# Patient Record
Sex: FEMALE | RURAL
[De-identification: names, ages, dates, MRNs, and addresses within clinical notes are randomized per-mention and may not be internally consistent; named-entity substitution may affect disease eponyms.]

---

## 2017-05-31 ENCOUNTER — APPOINTMENT (RX ONLY)
Dept: RURAL CLINIC 1 | Facility: CLINIC | Age: 32
Setting detail: DERMATOLOGY
End: 2017-05-31

## 2017-05-31 DIAGNOSIS — L82.0 INFLAMED SEBORRHEIC KERATOSIS: ICD-10-CM

## 2017-05-31 PROBLEM — Z92.3 PERSONAL HISTORY OF IRRADIATION: Status: ACTIVE | Noted: 2017-05-31

## 2017-05-31 PROBLEM — J45.909 UNSPECIFIED ASTHMA, UNCOMPLICATED: Status: ACTIVE | Noted: 2017-05-31

## 2017-05-31 PROBLEM — D48.5 NEOPLASM OF UNCERTAIN BEHAVIOR OF SKIN: Status: ACTIVE | Noted: 2017-05-31

## 2017-05-31 PROCEDURE — 11100: CPT

## 2017-05-31 PROCEDURE — ? COUNSELING

## 2017-05-31 PROCEDURE — ? BIOPSY BY SHAVE METHOD

## 2017-05-31 PROCEDURE — 99202 OFFICE O/P NEW SF 15 MIN: CPT | Mod: 25

## 2017-05-31 ASSESSMENT — LOCATION ZONE DERM: LOCATION ZONE: ARM

## 2017-05-31 ASSESSMENT — LOCATION SIMPLE DESCRIPTION DERM: LOCATION SIMPLE: RIGHT FOREARM

## 2017-05-31 ASSESSMENT — LOCATION DETAILED DESCRIPTION DERM: LOCATION DETAILED: RIGHT DISTAL RADIAL DORSAL FOREARM

## 2017-05-31 NOTE — PROCEDURE: BIOPSY BY SHAVE METHOD
Body Location Override (Optional - Billing Will Still Be Based On Selected Body Map Location If Applicable): Left mid dorsal foot

## 2017-05-31 NOTE — HPI: SKIN LESION
Has Your Skin Lesion Been Treated?: not been treated
Is This A New Presentation, Or A Follow-Up?: Skin Lesion
Is This A New Presentation, Or A Follow-Up?: Growth

## 2018-05-14 ENCOUNTER — APPOINTMENT (RX ONLY)
Dept: RURAL CLINIC 1 | Facility: CLINIC | Age: 33
Setting detail: DERMATOLOGY
End: 2018-05-14

## 2018-05-14 PROBLEM — D23.72 OTHER BENIGN NEOPLASM OF SKIN OF LEFT LOWER LIMB, INCLUDING HIP: Status: ACTIVE | Noted: 2018-05-14

## 2018-05-14 PROCEDURE — ? OTHER

## 2018-05-14 PROCEDURE — ? DEFER

## 2018-05-14 PROCEDURE — ? COUNSELING

## 2018-05-14 PROCEDURE — 99213 OFFICE O/P EST LOW 20 MIN: CPT

## 2018-05-14 NOTE — PROCEDURE: OTHER
Detail Level: Detailed
Other (Free Text): Symptomatic due to regrowth and chronic pain due to location
Note Text (......Xxx Chief Complaint.): This diagnosis correlates with the

## 2018-06-13 ENCOUNTER — APPOINTMENT (RX ONLY)
Dept: RURAL CLINIC 1 | Facility: CLINIC | Age: 33
Setting detail: DERMATOLOGY
End: 2018-06-13

## 2018-06-13 PROBLEM — D48.5 NEOPLASM OF UNCERTAIN BEHAVIOR OF SKIN: Status: ACTIVE | Noted: 2018-06-13

## 2018-06-13 PROCEDURE — 12032 INTMD RPR S/A/T/EXT 2.6-7.5: CPT

## 2018-06-13 PROCEDURE — 11402 EXC TR-EXT B9+MARG 1.1-2 CM: CPT

## 2018-06-13 PROCEDURE — ? EXCISION

## 2018-06-13 PROCEDURE — ? PRESCRIPTION

## 2018-06-13 RX ORDER — CEPHALEXIN 500 MG/1
CAPSULE ORAL
Qty: 10 | Refills: 0 | Status: ERX | COMMUNITY
Start: 2018-06-13

## 2018-06-13 RX ADMIN — CEPHALEXIN: 500 CAPSULE ORAL at 17:21

## 2018-06-13 NOTE — PROCEDURE: EXCISION
steady V-Y Flap Text: The defect edges were debeveled with a #15 scalpel blade.  Given the location of the defect, shape of the defect and the proximity to free margins a V-Y flap was deemed most appropriate.  Using a sterile surgical marker, an appropriate advancement flap was drawn incorporating the defect and placing the expected incisions within the relaxed skin tension lines where possible.    The area thus outlined was incised deep to adipose tissue with a #15 scalpel blade.  The skin margins were undermined to an appropriate distance in all directions utilizing iris scissors.

## 2018-06-27 ENCOUNTER — APPOINTMENT (RX ONLY)
Dept: RURAL CLINIC 1 | Facility: CLINIC | Age: 33
Setting detail: DERMATOLOGY
End: 2018-06-27

## 2018-06-27 DIAGNOSIS — Z71.89 OTHER SPECIFIED COUNSELING: ICD-10-CM

## 2018-06-27 DIAGNOSIS — Z48.817 ENCOUNTER FOR SURGICAL AFTERCARE FOLLOWING SURGERY ON THE SKIN AND SUBCUTANEOUS TISSUE: ICD-10-CM

## 2018-06-27 PROCEDURE — ? POST-OP WOUND CHECK

## 2018-06-27 PROCEDURE — ? COUNSELING

## 2018-06-27 ASSESSMENT — LOCATION ZONE DERM: LOCATION ZONE: FEET

## 2018-06-27 ASSESSMENT — LOCATION SIMPLE DESCRIPTION DERM: LOCATION SIMPLE: LEFT FOOT

## 2018-06-27 ASSESSMENT — LOCATION DETAILED DESCRIPTION DERM: LOCATION DETAILED: LEFT DORSAL FOOT

## 2018-06-27 NOTE — PROCEDURE: POST-OP WOUND CHECK
Detail Level: Detailed
Wound Evaluated By: ROLAND
Add 60920 Cpt? (Important Note: In 2017 The Use Of 55753 Is Being Tracked By Cms To Determine Future Global Period Reimbursement For Global Periods): no

## 2019-12-20 NOTE — PROCEDURE: EXCISION
Left message to call back for patient. Would like to discuss INR result.    Accession #: [id number] Accession #: [id number]

## 2021-09-08 NOTE — HPI: OTHER
[facility]   Department of Neurology  Headache Clinic      SUBJECTIVE      Patient returns to the neurology clinic at [facility] today in follow up of their chronic neurologic disease.  Patient was last seen on   Recent Visits  Date Type Provider Dept   08/25/21 V-Visit AMBER Leger [facility] Neurology   Showing recent visits within past 365 days with a meds authorizing provider and meeting all other requirements  Future Appointments  Date Type Provider Dept   09/09/21 Appointment Kyle Johnson DO [facility] Neurology   Showing future appointments within next 90 days with a meds authorizing provider and meeting all other requirements  At said visit patient was seen for   1. Examination of participant or control in clinical research    2. Chronic migraine    3. Medication overuse headache    for which they again follow up for today.  At that time patient was treated with   Current Outpatient Medications   Medication Instructions   • albuterol 108 (90 Base) MCG/ACT inhaler 2 puffs, Inhalation, EVERY 4 HOURS PRN   • cyclobenzaprine (FLEXERIL) 10 mg, Oral, 3 TIMES DAILY PRN   • fluticasone (FLONASE) 50 MCG/ACT nasal spray 1 SPRAY PER NOSTRIL BID PRN ALLERGIES/SINUS CONGESTION   • hydrochlorothiazide (HYDRODIURIL) 25 mg, Oral, DAILY   • HYDROcodone-acetaminophen (NORCO) 5-325 MG per tablet 1 tablet, Oral, EVERY 6 HOURS PRN, Indication:  Headache   • ibuprofen (MOTRIN) 600 mg, Oral, 2 TIMES DAILY PRN, Not more than 14 days per month   • Ketoprofen 75 mg, Oral, 2 TIMES DAILY PRN   • loratadine (CLARITIN) 10 mg, Oral, DAILY   • orlistat (IVANIA) 60 mg, Oral, 3 TIMES DAILY WITH MEALS   • phentermine (ADIPEX-P) 37.5 mg, Oral, DAILY BEFORE BREAKFAST   • sumatriptan (IMITREX) 100 MG tablet TAKE 1 TABLET BY MOUTH AT ONSET OF MIGRAINE AND REPEAT IN TWO HOURS. No more than 9 days per month.   • triamcinolone (ARISTOCORT) 0.1 % cream Topical, 2 TIMES DAILY   • triamterene-hydroCHLOROthiazide (MAXZIDE-25) 37.5-25 MG 
per tablet 1 tablet, Oral, DAILY     Since last visit patient states:    Overall symptoms have been modified to suggest persistence with aforementioned therapy.  Patient does expresses continued difficulties with headaches, again described as throbbing in quality typically located bitemporal and bioccipital areas with associated symptoms including but not limited to photophobia, phonophobia, and osmophobia.    Patient reports 35 headache days over the last 3 months.  When asked to rate the severity of head pain patient rates the pain at 7/10.  At our last visit, patient had reported 30 headache days over 3 months rated at a pain severity of 8/10.      Study Title: A Phase 4, Randomized, Double-Blind, Placebo-controlled, Parallel-group Study to Evaluate the Efficacty and Safety of Erenumab in Adults With Chronic Migraine and Medication Overuse Headache    PI: Kyle Johnson DO    PT ID:[id number] (Ask CRC for)    Patient presents to clinic today for enrollment into the [id number] (Erenumab) [facility] Clinical Trial.    I have personally reviewed all the Part 1 Inclusion/Exclusion Criteria and attest that the patient meets initial eligibility criteria and can be enrolled into the study.    Informed consent was conducted by myself and Research Coordinator, Farzana Martin. The Informed Consent document was signed and dated by all applicable parties prior to any study related procedures occurring.     Remaining eligibility will be confirmed through protocol screening procedures. Patient aware that eligibility for continued participation in the trial may change due to screening procedure results.    Physical Examination as below.    Frequency of migraine over past 6 months (average days/month): 17  Frequency of headache (migraine and non-migraine) over past 6 months (average days/month): 20  Does patient meet MOH criteria as per ICHD-3: Yes  Does patient have chronic headache with continuous pain w/o headache free periods: 
Condition:: Eval and tx
No  Approximate date of Chronic Migraine Diagnosis: 01/31/2018    Denies any other new neurologic signs or symptoms apart from that mentioned above.      Prophylactic Pain Response History   Response:  Partial response: Protriptyline (put her to sleep), Zonegran (Both worked then stopped working and when she restarted gave her headaches for some reason)  No response:  Adverse response:  Topamax (worsening headaches), Elavil, Propranolol, Seroquel, Geodon, Depakote, Amphetamines, Verapamil (worsening headaches)  Unknown response:     Actue Pain Response History   Response: Imitrex , Maxalt   Partial response: Cyclobenzaprine  No response: Diclofenac Na  Adverse response:  Zyprexa, regular ibuprofen (worsening / triggered headache)  Unknown response: Fioricet        IMPRESSION / REPORT / PLAN     Chronic Migraine with MOH (Triptans, Ibuprofen)      Persistent.  MIDAS: 115 (210)(121)(20)  Not able to tolerate SNRI therapy.  No current clinical evidence to support a formal diagnosis of fibromyalgia.    Plan     EKG  Labs  Informed consent    For Headache Prophylaxis:  Start Aimovig per research protocol.    For Acute Headache Therapy:   Ibuprofen gel cap formulation without the red dye for mild to moderate headaches as needed not more than 14 days per month.  Imitrex as needed for severe headaches but try and reduce to not more than 9 days per month.      Future Thoughts:  Gabapentin, Lamictal, Atenolol, Candesartan, CGRP (unable to get due to cost), Botox, ONB, Retrial Protriptyline at night.      Blood pressure 110/80, pulse 70, temperature 98.5 °F (36.9 °C), temperature source Oral, height 4' 11\" (1.499 m), weight 57 kg (125 lb 9.6 oz), last menstrual period 04/10/2021.  Orders Placed This Encounter   • Electrocardiogram 12-Lead     1. Chronic migraine    2. Examination of participant or control in clinical research    3. Medication overuse headache      Follow up per study protocol.    CARE DISCUSSION:  The results 
Please Describe Your Condition:: Consult requested by [facility] NP-C
of the evaluation were discussed with the patient.  Questions and concerns were addressed regarding the diagnosis and treatment recommendations.  They indicated  understanding of the issues discussed and agree with the plan of care.    All questions were answered and patient will notify the office if symptoms worsen or change in any way.      Total time spent today on this visit was 30 minutes which includes preparing to see the patient by reviewing prior records, obtaining and reviewing history, performing any physical exam components, counseling the patient, and documenting clinical information.      Kyle Johnson DO  Neurology  Headache Medicine               PROBLEM LIST  Patient Active Problem List   Diagnosis   • Arthralgia   • Migraine without aura and without status migrainosus, not intractable   • Cervicalgia   • Bilateral lower extremity edema   • Chronic fatigue   • Chronic pansinusitis   • Reactive airway disease without complication   • Overweight (BMI 25.0-29.9)       REVIEW OF SYSTEMS  14 point review of systems is negative other than what has been mentioned above or within the HPI.      MEDICATIONS  Reviewed and updated via EMR.  Current Outpatient Medications   Medication Sig Dispense Refill   • Ketoprofen 25 MG Cap Take 75 mg by mouth 2 times daily as needed (headache, max 10 days per month). 20 capsule 0   • sumatriptan (IMITREX) 100 MG tablet TAKE 1 TABLET BY MOUTH AT ONSET OF MIGRAINE AND REPEAT IN TWO HOURS. No more than 9 days per month. 18 tablet 0   • loratadine (CLARITIN) 10 MG tablet Take 10 mg by mouth daily.     • phentermine (ADIPEX-P) 37.5 MG tablet Take 1 tablet by mouth daily (before breakfast). 90 tablet 1   • orlistat (IVANIA) 60 MG capsule Take 1 capsule by mouth 3 times daily (with meals). 90 capsule 6   • hydrochlorothiazide (HYDRODIURIL) 25 MG tablet Take 1 tablet by mouth daily. 90 tablet 1   • HYDROcodone-acetaminophen (NORCO) 5-325 MG per tablet Take 1 tablet by mouth every 
6 hours as needed for Pain. Indication:  Headache 15 tablet 0   • triamterene-hydroCHLOROthiazide (MAXZIDE-25) 37.5-25 MG per tablet Take 1 tablet by mouth daily. 90 tablet 1   • ibuprofen (MOTRIN) 600 MG tablet Take 1 tablet by mouth 2 times daily as needed (headache). Not more than 14 days per month 28 tablet 2   • triamcinolone (ARISTOCORT) 0.1 % cream Apply topically 2 times daily. 30 g 1   • fluticasone (FLONASE) 50 MCG/ACT nasal spray 1 SPRAY PER NOSTRIL BID PRN ALLERGIES/SINUS CONGESTION 16 g 3   • cyclobenzaprine (FLEXERIL) 10 MG tablet Take 10 mg by mouth 3 times daily as needed for Muscle spasms.     • albuterol 108 (90 Base) MCG/ACT inhaler Inhale 2 puffs into the lungs every 4 hours as needed for Wheezing. 8.5 g 0     No current facility-administered medications for this visit.       ALLERGIES  ALLERGIES:   Allergen Reactions   • Benicar [Olmesartan Medoxomil] Other (See Comments)     (increased blood pressure)   • Dust Mite Extract Other (See Comments)   • Morphine NAUSEA   • Advil Cold & HEADACHES     Advil coded - red dye    • Atenolol HEADACHES   • Doxycycline HEADACHES   • Nadolol HEADACHES   • Potassium HEADACHES       Deena Roman's past medical history, family history, and social history were reviewed and are unchanged from previous.       OBJECTIVE / EXAM      Vitals: As above.  Constitutional Well developed with appropriate attire    Eyes No eyelid ptosis, no scleral icterus or injection, pupils are equal   ENT External ears and nose without abnormality.  Hearing intact to normal conversation.  Mucous membranes moist.  Dentition appears good.  No oral mucosa lesions, tongue normal.     Neck Trachea midline, no neck masses.   Respiratory No increased respiratory effort.   Lungs clear to ascultation bilaterally.  No audible wheezing.   Cardiovascular  No significant extremity edema.  RRR.  S1, S2. No abnormal heart sounds.  2+ radial and dorsalis pedis pulses bilaterally.   
  Musculoskeletal   No clubbing or cyanosis.  Normal cervical and lumbar spine alinement.        Skin Inspection of skin showed no visable rash.  Normal temperature and texture to touch.   Psychiatric    MENTAL STATUS    Appearance Normal posture and attire   Attitude/Behavior Normal psychomotor activity and expressions with good eye contact   Level of Consciousness Alert without hypervigilance or drowsiness.   Orientation Oriented to person, place, and time.     Speech and Language  Normal Quantity, Rate, Volume/Tone, and Fluency   Attention Span Attends, not distractable    Mood/Affect Good.  Congruent.   Thought Process Linear   Thought Content No hallucinations or delusions.     Insight and Judgement Intact     NEURO:    Mental Status COGNITIVE DOMAIN Scored CN Exam Finding   Visuspatial / Executive Not tested I Not tested   Naming Intact II Pupils equal round and reactive to light.  Visual fields full to confrontation.  Optic disc examination with sharp edges and no evidence for disc edema.   Attention Intact III,IV,VI Extraocuular muscles intact.  No Nystagmus.  Smooth persuits intact.     Language Intact.  Speech fluent without dysarthria or aphasia. V Intact to light touch V1, V2, V3 bilaterally.   Abstraction Not tested VII Face Symmetric.  All facial muscles intact and equal.   Memory / Delayed Recall Within normal limits for age VIII Hearing Intact to normal voice   Orientation Intact IX,X Uvula Midline, Palate elevates symmetrically   TOTAL  XI SCM 5/5 bilaterally.   Comments:  XII Tongue Midline, no atropy or fasiculations   MOTOR:  5/5 Strength in upper and lower extremities bilaterally.  Normal tone.  No tremors, fasciculations, myoclonus, or dystonia.       SENSORY: Intact to light touch in upper and lower extremities bilaterally.  DTR: 2/4 biceps, brachiorad, patella, achilles bilaterally.  COORDINATION: No gross ataxia or dysmetria on observed movements bilaterally.  GAIT: Normal

## 2022-12-27 NOTE — PROCEDURE: EXCISION
My signature below certifies that the above stated patient is homebound and upon completion of the Face-To-Face encounter, has the need for intermittent skilled nursing, physical therapy and/or speech or occupational therapy services in their home for their current diagnosis as outlined in their initial plan of care. These services will continue to be monitored by myself or another physician. Purse String (Simple) Text: Given the location of the defect and the characteristics of the surrounding skin a purse string simple closure was deemed most appropriate.  Undermining was performed circumferentially around the surgical defect.  A purse string suture was then placed and tightened.